# Patient Record
(demographics unavailable — no encounter records)

---

## 2024-11-14 NOTE — REASON FOR VISIT
[de-identified] : AVR w/25 Inspiris, CABG x 1 (LIMA->LAD) [de-identified] : 11/6/24  [de-identified] : Intraop uncomplicated. Transferred to ICU intubated on levo. Primacor started. POD 1 extubated. primacor weaned off. POD 3 TTE bioprosthetic valve with proper function, no pericardial effusion. POD4 patient went into rate controlled a-flutter , increased BB. Pt given additional IV lasix. Patient remains stable. Discharged home on 11/12. chest xray XX

## 2024-11-14 NOTE — REASON FOR VISIT
[de-identified] : AVR w/25 Inspiris, CABG x 1 (LIMA->LAD) [de-identified] : 11/6/24  [de-identified] : Intraop uncomplicated. Transferred to ICU intubated on levo. Primacor started. POD 1 extubated. primacor weaned off. POD 3 TTE bioprosthetic valve with proper function, no pericardial effusion. POD4 patient went into rate controlled a-flutter , increased BB. Pt given additional IV lasix. Patient remains stable. Discharged home on 11/12. chest xray XX

## 2024-11-15 NOTE — HISTORY OF PRESENT ILLNESS
[Diabetes Mellitus] : Diabetes Mellitus [Diet] : controlled with diet [Insulin] : controlled with insulin [Anginal Classification within 2 wks] : Angina over the last 2 weeks [FreeTextEntry1] : 53 YO Male w/ PMHx of Type 1 DM c/b retinopathy who presented to OSH c/o sudden acute SOB. TTE revealed severe AS and he was transferred to [facility] under the care of Dr. Renee for surgical evaluation. Preop cardiac cath showed LAD disease. On 11/6/24 he underwent an uncomplicated AVR (25mm Inspiris), CABGx 1 with Dr. Renee. He arrived to the CTICU intubated on low dose levo. Primacor started for low cardiac indices. POD1 he was extubated, primacor weaned off. POD2 pleural and all but x1 mediastinal shaunna removed. Transferred to [facility]. BB started. POD3 TTE completed, bioprosthetic valve with proper function. POD4 patient went into rate-controlled a-flutter this AM, increased BB. Additional Lasix given. POD5 Wires and shaunna removed. POD 6 BB increased. 1x IV Lasix gave.  Cleared for discharge per Dr. Renee. At time of discharge, he is hemodynamically stable, voiding well, passing gas, ambulating in the hallway, and pain controlled under oral regimen.  Mr. Anna is recovering at home with his significant other Selene. He is ambulating independently.  Pt denies dizziness, SOB at rest, abdominal pain, constipation, and dysuria. Pt c/o MANJARREZ that improves with rest and incisional pain managed with prn Acetaminophen and Oxycodone. Pt verbalized LE swelling.  Blood glucose 206 this morning and 134 prior to my arrival. He has increased his Insulin Glargine to 70 units daily and is taking Lispro Insulin sliding scale with meals, most recently took 12 units with breakfast.

## 2024-11-15 NOTE — PHYSICAL EXAM
[Sclera] : the sclera and conjunctiva were normal [PERRL With Normal Accommodation] : pupils were equal in size, round, and reactive to light [Neck Appearance] : the appearance of the neck was normal [Respiration, Rhythm And Depth] : normal respiratory rhythm and effort [Exaggerated Use Of Accessory Muscles For Inspiration] : no accessory muscle use [Apical Impulse] : the apical impulse was normal [Heart Rate And Rhythm] : heart rate was normal and rhythm regular [Heart Sounds] : normal S1 and S2 [Heart Sounds Gallop] : no gallops [Murmurs] : no murmurs [Heart Sounds Pericardial Friction Rub] : no pericardial rub [Examination Of The Chest] : the chest was normal in appearance [Chest Visual Inspection Thoracic Asymmetry] : no chest asymmetry [Diminished Respiratory Excursion] : normal chest expansion [2+] : left 2+ [Breast Appearance] : normal in appearance [Bowel Sounds] : normal bowel sounds [Abdomen Soft] : soft [Abdomen Tenderness] : non-tender [Abnormal Walk] : normal gait [Nail Clubbing] : no clubbing  or cyanosis of the fingernails [Involuntary Movements] : no involuntary movements were seen [Skin Color & Pigmentation] : normal skin color and pigmentation [Skin Turgor] : normal skin turgor [] : no rash [No Focal Deficits] : no focal deficits [Oriented To Time, Place, And Person] : oriented to person, place, and time [Impaired Insight] : insight and judgment were intact [Affect] : the affect was normal [Mood] : the mood was normal [Memory Recent] : recent memory was not impaired [Memory Remote] : remote memory was not impaired [FreeTextEntry1] : CT sites clean, dry, and intact. LE edema +1 non pitting.

## 2024-11-15 NOTE — ASSESSMENT
[FreeTextEntry1] : S/p AVR & CABG x1- continue Asa, Atorvastatin, furosemide, and Metoprolol Tartrate.  Start Cephalexin 500 mg po QID x 5 days.  DM- continue Glargine and Lispro insulin. Continue Consistent carb diet.

## 2024-11-15 NOTE — PLAN
[TextEntry] : Post Operative Care:  Pt advised of importance of daily weights. Pt advised to call [facility] NP for weight gain of 3 lbs or more.   Pt instructed on how to use incentive spirometer every hour, demonstrated proper use.  Pt encouraged to ambulate as much as tolerated, avoiding extreme temperatures outdoors.  Also advised to cleanse incisions daily with mild soap and water and to avoid lotions, powders, ointments or creams near or on the incision.  Low salt, low fat diet encouraged and discussed.  Pt advised to avoid heavy lifting or straining. Pt advised no driving until cleared by Surgeon.    [facility] team will continue to follow up with pt status.  NP/CCC roles explained with pt understanding, contact information provided. Pt agrees to call with any questions, issues or concerns.  Worsening symptoms reviewed with patient understanding.      FOLLOW UP APPOINTMENTS:  CTS: Dr. Renee appointment 11/22/24  CARDIOLOGIST: Dr. Ramsey appointment 11/20/24  Endocrinology- Pt's PCP is arranging follow up  PCP: Pt encouraged to follow up within one month of discharge

## 2024-12-11 NOTE — ASSESSMENT
[FreeTextEntry1] : - Follow up with PCP/Cardiologist. - Continue current medication regimen. - Continue to increase activity and walk daily as tolerated. Continue to use incentive spirometer.  - No driving or strenuous activity for six weeks after surgery. Avoid lifting >10 to 15lbs for first two months after surgery.  - Continue to use compression stockings. Keep legs elevated above heart when resting/sitting/sleeping. - Call MD if you experience fever, fatigue, dizziness, confusion, syncope, shortness of breath, chest pain not relieved with analgesics, increased redness/drainage from incision. - Follow up in CTS clinic in one year with CTA chest.  
N/A

## 2024-12-11 NOTE — REASON FOR VISIT
[Home] : at home, [unfilled] , at the time of the visit. [Medical Office: ([facility])___] : at the medical office located in  [Patient] : the patient [Self] : self [de-identified] :  AVR w/25 Inspiris, CABG x 1 (LIMA->LAD) [de-identified] : 11/6/24 [de-identified] : Intraop uncomplicated. Transferred to ICU intubated on levo. Primacor started. POD 1 extubated. primacor weaned off. POD 3 TTE bioprosthetic valve with proper function, no pericardial effusion. POD4 patient went into rate controlled a-flutter , increased BB. Pt given additional IV lasix. Patient remains stable. Discharged home on 11/12. chest xray 11-21-24: Postop change. Small bilateral pleural effusions. Minimal lower lung focal atelectasis. No pneumothorax. No acute infiltrate appearing. Patient presents for 2nd postop via telehealth. Chest xray 11/29/24: Interval resolution of right and interval decrease of left pleural effusion.  Patient is recuperating well from surgery. He is ambulating and increasing his activities daily. Patient denies fever, chills, dizziness, syncope, shortness of breath, chest pain, palpitations or peripheral edema.

## 2024-12-11 NOTE — REASON FOR VISIT
[Home] : at home, [unfilled] , at the time of the visit. [Medical Office: ([facility])___] : at the medical office located in  [Patient] : the patient [Self] : self [de-identified] :  AVR w/25 Inspiris, CABG x 1 (LIMA->LAD) [de-identified] : 11/6/24 [de-identified] : Intraop uncomplicated. Transferred to ICU intubated on levo. Primacor started. POD 1 extubated. primacor weaned off. POD 3 TTE bioprosthetic valve with proper function, no pericardial effusion. POD4 patient went into rate controlled a-flutter , increased BB. Pt given additional IV lasix. Patient remains stable. Discharged home on 11/12. chest xray 11-21-24: Postop change. Small bilateral pleural effusions. Minimal lower lung focal atelectasis. No pneumothorax. No acute infiltrate appearing. Patient presents for 2nd postop via telehealth. Chest xray 11/29/24: Interval resolution of right and interval decrease of left pleural effusion.  Patient is recuperating well from surgery. He is ambulating and increasing his activities daily. Patient denies fever, chills, dizziness, syncope, shortness of breath, chest pain, palpitations or peripheral edema.

## 2024-12-11 NOTE — REASON FOR VISIT
[Home] : at home, [unfilled] , at the time of the visit. [Medical Office: ([facility])___] : at the medical office located in  [Patient] : the patient [Self] : self [de-identified] :  AVR w/25 Inspiris, CABG x 1 (LIMA->LAD) [de-identified] : 11/6/24 [de-identified] : Intraop uncomplicated. Transferred to ICU intubated on levo. Primacor started. POD 1 extubated. primacor weaned off. POD 3 TTE bioprosthetic valve with proper function, no pericardial effusion. POD4 patient went into rate controlled a-flutter , increased BB. Pt given additional IV lasix. Patient remains stable. Discharged home on 11/12. chest xray 11-21-24: Postop change. Small bilateral pleural effusions. Minimal lower lung focal atelectasis. No pneumothorax. No acute infiltrate appearing. Patient presents for 2nd postop via telehealth. Chest xray 11/29/24: Interval resolution of right and interval decrease of left pleural effusion.  Patient is recuperating well from surgery. He is ambulating and increasing his activities daily. Patient denies fever, chills, dizziness, syncope, shortness of breath, chest pain, palpitations or peripheral edema.

## 2024-12-11 NOTE — END OF VISIT
[FreeTextEntry3] : I, Dr. Brady Renee, personally performed the evaluation and management (E/M) services for this established patient who presents today with (a) new problem(s)/exacerbation of (an) existing condition(s).  That E/M includes conducting the clinically appropriate interval history &/or exam, assessing all new/exacerbated conditions, and establishing a new plan of care.  Today, my JAYRO,  was here to observe &/or participate in the visit & follow plan of care established by me.